# Patient Record
Sex: FEMALE | Race: WHITE | Employment: FULL TIME | URBAN - METROPOLITAN AREA
[De-identification: names, ages, dates, MRNs, and addresses within clinical notes are randomized per-mention and may not be internally consistent; named-entity substitution may affect disease eponyms.]

---

## 2018-08-28 ENCOUNTER — CARE COORDINATION (OUTPATIENT)
Dept: CARDIOLOGY | Facility: CLINIC | Age: 18
End: 2018-08-28

## 2018-08-28 DIAGNOSIS — Z82.49 FH: ARRHYTHMOGENIC RIGHT VENTRICULAR CARDIOMYOPATHY: Primary | ICD-10-CM

## 2018-10-12 ENCOUNTER — CARE COORDINATION (OUTPATIENT)
Dept: CARDIOLOGY | Facility: CLINIC | Age: 18
End: 2018-10-12

## 2018-10-12 ENCOUNTER — HOSPITAL ENCOUNTER (OUTPATIENT)
Dept: MRI IMAGING | Facility: CLINIC | Age: 18
Discharge: HOME | End: 2018-10-12
Attending: INTERNAL MEDICINE | Admitting: INTERNAL MEDICINE
Payer: COMMERCIAL

## 2018-10-12 DIAGNOSIS — Z82.49 FH: ARRHYTHMOGENIC RIGHT VENTRICULAR CARDIOMYOPATHY: ICD-10-CM

## 2018-10-12 PROCEDURE — 75561 CARDIAC MRI FOR MORPH W/DYE: CPT

## 2018-10-12 PROCEDURE — 75561 CARDIAC MRI FOR MORPH W/DYE: CPT | Mod: 26 | Performed by: INTERNAL MEDICINE

## 2018-10-12 PROCEDURE — A9585 GADOBUTROL INJECTION: HCPCS | Performed by: INTERNAL MEDICINE

## 2018-10-12 PROCEDURE — 25500064 ZZH RX 255 OP 636: Performed by: INTERNAL MEDICINE

## 2018-10-12 RX ORDER — GADOBUTROL 604.72 MG/ML
7.5 INJECTION INTRAVENOUS ONCE
Status: COMPLETED | OUTPATIENT
Start: 2018-10-12 | End: 2018-10-12

## 2018-10-12 RX ADMIN — GADOBUTROL 7.5 ML: 604.72 INJECTION INTRAVENOUS at 07:52

## 2018-10-12 NOTE — PROGRESS NOTES
Date: 10/12/2018    Time of Call: 4:34 PM     Diagnosis:  Family history of ARVC     [ TORB ] Ordering provider: Dr Shelley Manning  Order: Follow up with Dr Manning in 2 years with a cardiac MRI, 14 day zio, and EKG     Order received by: Alex Quiroga RN     Follow-up/additional notes: None.

## 2020-08-20 ENCOUNTER — CARE COORDINATION (OUTPATIENT)
Dept: CARDIOLOGY | Facility: CLINIC | Age: 20
End: 2020-08-20

## 2020-08-20 DIAGNOSIS — Z82.49 FH: ARRHYTHMOGENIC RIGHT VENTRICULAR CARDIOMYOPATHY: Primary | ICD-10-CM

## 2020-09-08 ENCOUNTER — ALLIED HEALTH/NURSE VISIT (OUTPATIENT)
Dept: CARDIOLOGY | Facility: CLINIC | Age: 20
End: 2020-09-08
Attending: INTERNAL MEDICINE
Payer: COMMERCIAL

## 2020-09-08 DIAGNOSIS — Z82.49 FH: ARRHYTHMOGENIC RIGHT VENTRICULAR CARDIOMYOPATHY: ICD-10-CM

## 2020-09-10 PROCEDURE — 0298T LEADLESS EKG MONITOR 3 TO 14 DAYS: CPT | Mod: ZP | Performed by: INTERNAL MEDICINE

## 2020-10-16 ENCOUNTER — OFFICE VISIT (OUTPATIENT)
Dept: CARDIOLOGY | Facility: CLINIC | Age: 20
End: 2020-10-16
Attending: INTERNAL MEDICINE
Payer: COMMERCIAL

## 2020-10-16 ENCOUNTER — HOSPITAL ENCOUNTER (OUTPATIENT)
Dept: MRI IMAGING | Facility: CLINIC | Age: 20
Discharge: HOME | End: 2020-10-16
Attending: INTERNAL MEDICINE | Admitting: INTERNAL MEDICINE
Payer: COMMERCIAL

## 2020-10-16 ENCOUNTER — MEDICAL CORRESPONDENCE (OUTPATIENT)
Dept: HEALTH INFORMATION MANAGEMENT | Facility: CLINIC | Age: 20
End: 2020-10-16

## 2020-10-16 VITALS
HEIGHT: 68 IN | DIASTOLIC BLOOD PRESSURE: 66 MMHG | BODY MASS INDEX: 23.19 KG/M2 | HEART RATE: 74 BPM | SYSTOLIC BLOOD PRESSURE: 120 MMHG | WEIGHT: 153 LBS | OXYGEN SATURATION: 99 %

## 2020-10-16 DIAGNOSIS — Z82.49 FH: ARRHYTHMOGENIC RIGHT VENTRICULAR CARDIOMYOPATHY: ICD-10-CM

## 2020-10-16 PROCEDURE — 93010 ELECTROCARDIOGRAM REPORT: CPT | Performed by: INTERNAL MEDICINE

## 2020-10-16 PROCEDURE — 93005 ELECTROCARDIOGRAM TRACING: CPT

## 2020-10-16 PROCEDURE — A9585 GADOBUTROL INJECTION: HCPCS | Performed by: INTERNAL MEDICINE

## 2020-10-16 PROCEDURE — G0463 HOSPITAL OUTPT CLINIC VISIT: HCPCS | Mod: 25

## 2020-10-16 PROCEDURE — 75561 CARDIAC MRI FOR MORPH W/DYE: CPT

## 2020-10-16 PROCEDURE — 75561 CARDIAC MRI FOR MORPH W/DYE: CPT | Mod: 26 | Performed by: INTERNAL MEDICINE

## 2020-10-16 PROCEDURE — 99203 OFFICE O/P NEW LOW 30 MIN: CPT | Mod: 25 | Performed by: INTERNAL MEDICINE

## 2020-10-16 PROCEDURE — 255N000002 HC RX 255 OP 636: Performed by: INTERNAL MEDICINE

## 2020-10-16 RX ORDER — GADOBUTROL 604.72 MG/ML
7.5 INJECTION INTRAVENOUS ONCE
Status: COMPLETED | OUTPATIENT
Start: 2020-10-16 | End: 2020-10-16

## 2020-10-16 RX ADMIN — GADOBUTROL 7.5 ML: 604.72 INJECTION INTRAVENOUS at 09:04

## 2020-10-16 ASSESSMENT — MIFFLIN-ST. JEOR: SCORE: 1502.5

## 2020-10-16 ASSESSMENT — PAIN SCALES - GENERAL: PAINLEVEL: NO PAIN (0)

## 2020-10-16 NOTE — NURSING NOTE
Chief Complaint   Patient presents with     New Patient     CV Genetics 10/16/2020: 20 year old female with family history of Arrthymogenic Right Venticular Cardiomyopathy presenting for evaluation.      Vitals were taken and medications were reconciled. EKG was performed.    Kiersten Parnell  2:10 PM

## 2020-10-16 NOTE — LETTER
"10/16/2020      RE: Arcelia Hyatt  [address]  [place] [address]       Dear Colleague,    Thank you for the opportunity to participate in the care of your patient, Arcelia Hyatt, at the [facility] [place] [facility]. Please see a copy of my visit note below.    HPI: 21 yo female with family history of arrhythmogenic cardiomyopathy presents for cardiac evaluation.  Pt reports that she has been feeling well.  She denies any chest pain or pressure, sob/zaragoza, orthopnea, pnd, palpitations, syncope/presyncope, tian or exercise intolerance.      PAST MEDICAL HISTORY:  None    PAST MEDICAL HISTORY:  [place] teeth removal    FAMILY HISTORY:  H/o arrhythmogenic cardiomyopathy.   Maternal uncle also with arrhythmias but felt 2/2 sleep apnea    SOCIAL HISTORY:  Social History     Socioeconomic History     Marital status: Single   Tobacco Use     Smoking status: None   Substance and Sexual Activity     Alcohol use: Occasional - 2 drinks/week     Drug use: None     Sexual activity: Heterosexual       CURRENT MEDICATIONS:  None    ROS:   Constitutional: No fever, chills, or sweats. No weight gain/loss.   ENT: No visual disturbance, ear ache, epistaxis, sore throat.   Allergies/Immunologic: Negative.   Respiratory: No cough, hemoptysis.   Cardiovascular: As per HPI.   GI: No nausea, vomiting, hematemesis, melena, or hematochezia.   : No urinary frequency, dysuria, or hematuria.   Integument: Negative.   Psychiatric: Negative.   Neuro: Negative.   Endocrinology: Negative.   Musculoskeletal: Negative.    EXAM:  /66 (BP Location: Right arm, Patient Position: Chair, Cuff Size: Adult Regular)   Pulse 74   Ht 1.727 m (5' 8\")   Wt 69.4 kg (153 lb)   SpO2 99%   BMI 23.26 kg/m    General: appears comfortable, alert and articulate  Head: normocephalic, atraumatic  Eyes: anicteric sclera, EOMI  Neck: no adenopathy, 2+ carotids without " bruits  Orophyarynx: moist mucosa, no lesions, dentition intact  Heart: regular, S1/S2, no murmur, gallop, rub, estimated JVP 6cm  Lungs: clear, no rales or wheezing  Abdomen: soft, non-tender, bowel sounds present, no hepatomegaly  Extremities: no clubbing, cyanosis or edema  Neurological: normal speech and affect, no gross motor deficits     14 day Zio Sept 2020      CMR Report 10-  SUMMARY  Clinical history: 20-year-old female with a family history of arrhythmogenic cardiomyopathy.  Comparison CMR: 10/12/2018  1. The LV is normal in cavity size and wall thickness. The global systolic function is normal. The LVEF is 58%. There are no regional wall motion abnormalities. There is no obvious fatty replacement.  2. The RV is normal in cavity size. The global systolic function is normal. There are no regional  abnormalities. The RVEF is 62%.   3. Both atria are normal in size.  4. There is no significant valvular disease.   5. Late gadolinium enhancement imaging shows no MI, fibrosis or infiltrative disease.   6. There is no pericardial effusion or thickening.  CONCLUSIONS:   No signs of arrhythmogenic cardiomyopathy. Compared to the prior CMR of 10/12/2018, there have been no changes.    Assessment and Plan:  19 yo female with family history of arrhythmogenic cardiomyopathy presents for cardiac evaluation.  1.  family history of arrhythmogenic cardiomyopathy:  Had extensive discussion with patient about pathophysiology of arrhythmogenic cardiomyopathy.   Pt's MRI and Zio monitoring grossly unremarkable.  Genetic status unknown.  Given both of these at present no indication to initiate beta-blockage therapy or place exercise restrictions at present.  Discussed with patient the need to avoid all stimulants including Sudafed and all ephedrine containing products as well as methamphetamines and cocaine and limit alcohol intake to less than 7 drinks per week..  Pt again reminded that all first degree relatives  should be screened for arrhythmogenic cardiomyopathy with cardiac MRI, EKG and Holter/Zio monitor.  If family members decide to pursue genetic testing, then clinical screening would be indicated in gene positive, not gene negative, individuals. Pt educated that any syncopal episode is considered a medical emergency requiring ER visit/evaluaion.      Follow-up:  2 years with an cardiac MRI, Zio, and cardiopulmonary stress test.  Will be happy to see sooner if change in clinical status or new questions/concerns arise.      Shelley Manning MD  Section Head - Advanced Heart Failure, Transplantation and Mechanical Circulatory Support  Director - Adult Congenital and Cardiovascular Genetics Center  Associate Professor of Medicine, [facility]      Please do not hesitate to contact me if you have any questions/concerns.     Sincerely,     Shelley Manning MD

## 2020-10-16 NOTE — PATIENT INSTRUCTIONS
You were seen today in the Adult Congenital and Cardiovascular Genetics Clinic at the [facility].    Cardiology Providers you saw during your visit:  Dr Shelley Manning    Diagnosis:  Family history Arrthymogenic Right Venticular Cardiomyopathy    Results:  The results of your MRI and Zio monitor in clinic today    Recommendations:    1.  Continue to eat a heart healthy, low salt diet.  2.  Continue to get 20-30 minutes of aerobic activity, 4-5 days per week.  Examples of aerobic activity include walking, running, swimming, cycling, etc.  3.  Continue to observe good oral hygiene, with regular dental visits.  4.  We recommend that you avoid any stimulants such as cocaine or methamphetamines   5.  Please limit your alcohol intake to less then 7 drinks per week (not in one sitting).  6.  If you are thinking about becoming pregnant, we would like to assess your heart function within prior to becoming pregnant.     SBE prophylaxis:   Yes____  No__x__    Lifelong Bacterial Endocarditis Prophylaxis:  YES____  NO____    If YES is checked, follow the recommendations outlined below:   1. Take antibiotic(s) prior to recommended dental procedures and procedures on the respiratory tract or with infected skin, muscle or bones. SBE prophylaxis is not needed for routine GI and  procedures (ie. Colonoscopy or vaginal delivery)   2. Observe good oral hygiene daily, as advised by your dentist. Get regular professional dental care.   3. Keep cuts clean.   4. Infections should be treated promptly.   5. Symptoms of Infective Endocarditis could include: fever lasting more than 4-5 days or a recurrent fever that initially resolves but returns within 1-2 days)     Exercise restrictions:   Yes____  No____         If yes, list restrictions:  Must be allowed to rest if fatigued or SOB      Work restrictions:  Yes____  No___x_         If yes, list restrictions:    FASTING CHOLESTEROL was checked in the last 5 years YES___  NO_x__    Continue to eat a heart healthy, low salt diet.         ____ Fasting lipid panel order today         ____ No changes in medications          ____ I recommend the following changes in your cholesterol medications.:          ____ Please follow up for cholesterol screening at your primary care physician      Follow-up:  Follow up with Dr Manning in two years with a cardiac MRI, Zio monitor, and CPX.    For after hours urgent needs, call [phone] and ask to speak to the Adult Congenital Physician on call.  Mention Job Code 0401.    For emergencies call 911.    For any scheduling needs, please call [facility] , at [phone]  Thank you for your visit today!  If you have questions or concerns about today's visit, please call me.    Alex Quiroga RN, BSN  Cardiology Care Coordinator  [facility]  [phone]    [address]  Mail [id number]  [place] [address]

## 2020-10-17 LAB — INTERPRETATION ECG - MUSE: NORMAL

## 2020-11-16 NOTE — PROGRESS NOTES
"HPI: 21 yo female with family history of arrhythmogenic cardiomyopathy presents for cardiac evaluation.  Pt reports that she has been feeling well.  She denies any chest pain or pressure, sob/zaragoza, orthopnea, pnd, palpitations, syncope/presyncope, tian or exercise intolerance.      PAST MEDICAL HISTORY:  None    PAST MEDICAL HISTORY:  [place] teeth removal    FAMILY HISTORY:  H/o arrhythmogenic cardiomyopathy.   Maternal uncle also with arrhythmias but felt 2/2 sleep apnea    SOCIAL HISTORY:  Social History     Socioeconomic History     Marital status: Single   Tobacco Use     Smoking status: None   Substance and Sexual Activity     Alcohol use: Occasional - 2 drinks/week     Drug use: None     Sexual activity: Heterosexual       CURRENT MEDICATIONS:  None    ROS:   Constitutional: No fever, chills, or sweats. No weight gain/loss.   ENT: No visual disturbance, ear ache, epistaxis, sore throat.   Allergies/Immunologic: Negative.   Respiratory: No cough, hemoptysis.   Cardiovascular: As per HPI.   GI: No nausea, vomiting, hematemesis, melena, or hematochezia.   : No urinary frequency, dysuria, or hematuria.   Integument: Negative.   Psychiatric: Negative.   Neuro: Negative.   Endocrinology: Negative.   Musculoskeletal: Negative.    EXAM:  /66 (BP Location: Right arm, Patient Position: Chair, Cuff Size: Adult Regular)   Pulse 74   Ht 1.727 m (5' 8\")   Wt 69.4 kg (153 lb)   SpO2 99%   BMI 23.26 kg/m    General: appears comfortable, alert and articulate  Head: normocephalic, atraumatic  Eyes: anicteric sclera, EOMI  Neck: no adenopathy, 2+ carotids without bruits  Orophyarynx: moist mucosa, no lesions, dentition intact  Heart: regular, S1/S2, no murmur, gallop, rub, estimated JVP 6cm  Lungs: clear, no rales or wheezing  Abdomen: soft, non-tender, bowel sounds present, no hepatomegaly  Extremities: no clubbing, cyanosis or edema  Neurological: normal speech and affect, no gross motor deficits     14 day Zio Sept " 2020      CMR Report 10-  SUMMARY  Clinical history: 20-year-old female with a family history of arrhythmogenic cardiomyopathy.  Comparison CMR: 10/12/2018  1. The LV is normal in cavity size and wall thickness. The global systolic function is normal. The LVEF is 58%. There are no regional wall motion abnormalities. There is no obvious fatty replacement.  2. The RV is normal in cavity size. The global systolic function is normal. There are no regional  abnormalities. The RVEF is 62%.   3. Both atria are normal in size.  4. There is no significant valvular disease.   5. Late gadolinium enhancement imaging shows no MI, fibrosis or infiltrative disease.   6. There is no pericardial effusion or thickening.  CONCLUSIONS:   No signs of arrhythmogenic cardiomyopathy. Compared to the prior CMR of 10/12/2018, there have been no changes.    Assessment and Plan:  21 yo female with family history of arrhythmogenic cardiomyopathy presents for cardiac evaluation.  1.  family history of arrhythmogenic cardiomyopathy:  Had extensive discussion with patient about pathophysiology of arrhythmogenic cardiomyopathy.   Pt's MRI and Zio monitoring grossly unremarkable.  Genetic status unknown.  Given both of these at present no indication to initiate beta-blockage therapy or place exercise restrictions at present.  Discussed with patient the need to avoid all stimulants including Sudafed and all ephedrine containing products as well as methamphetamines and cocaine and limit alcohol intake to less than 7 drinks per week..  Pt again reminded that all first degree relatives should be screened for arrhythmogenic cardiomyopathy with cardiac MRI, EKG and Holter/Zio monitor.  If family members decide to pursue genetic testing, then clinical screening would be indicated in gene positive, not gene negative, individuals. Pt educated that any syncopal episode is considered a medical emergency requiring ER visit/evaluaion.      Follow-up:  2  years with an cardiac MRI, Zio, and cardiopulmonary stress test.  Will be happy to see sooner if change in clinical status or new questions/concerns arise.      Shelley Manning MD  Section Head - Advanced Heart Failure, Transplantation and Mechanical Circulatory Support  Director - Adult Congenital and Cardiovascular Genetics Center  Associate Professor of Medicine, [facility]

## 2021-01-15 ENCOUNTER — HEALTH MAINTENANCE LETTER (OUTPATIENT)
Age: 21
End: 2021-01-15

## 2021-03-24 ASSESSMENT — ENCOUNTER SYMPTOMS
ARTHRALGIAS: 0
SHORTNESS OF BREATH: 0
PARESTHESIAS: 0
CONSTIPATION: 0
ABDOMINAL PAIN: 0
DIARRHEA: 0
SORE THROAT: 0
CHILLS: 0
COUGH: 0
HEARTBURN: 0
NAUSEA: 0
FEVER: 0
PALPITATIONS: 0
HEMATOCHEZIA: 0
FREQUENCY: 0
JOINT SWELLING: 0
HEMATURIA: 0
HEADACHES: 0
BREAST MASS: 0
WEAKNESS: 0
NERVOUS/ANXIOUS: 1
DIZZINESS: 0
EYE PAIN: 0
MYALGIAS: 0
DYSURIA: 0

## 2021-03-29 ENCOUNTER — OFFICE VISIT (OUTPATIENT)
Dept: FAMILY MEDICINE | Facility: CLINIC | Age: 21
End: 2021-03-29
Payer: COMMERCIAL

## 2021-03-29 VITALS
HEIGHT: 67 IN | SYSTOLIC BLOOD PRESSURE: 118 MMHG | RESPIRATION RATE: 16 BRPM | HEART RATE: 78 BPM | WEIGHT: 145.08 LBS | OXYGEN SATURATION: 98 % | BODY MASS INDEX: 22.77 KG/M2 | TEMPERATURE: 98.6 F | DIASTOLIC BLOOD PRESSURE: 76 MMHG

## 2021-03-29 DIAGNOSIS — Z11.4 SCREENING FOR HIV (HUMAN IMMUNODEFICIENCY VIRUS): ICD-10-CM

## 2021-03-29 DIAGNOSIS — L98.9 SKIN LESION: ICD-10-CM

## 2021-03-29 DIAGNOSIS — Z11.59 NEED FOR HEPATITIS C SCREENING TEST: ICD-10-CM

## 2021-03-29 DIAGNOSIS — F34.1 DYSTHYMIA: ICD-10-CM

## 2021-03-29 DIAGNOSIS — Z76.89 ESTABLISHING CARE WITH NEW DOCTOR, ENCOUNTER FOR: ICD-10-CM

## 2021-03-29 DIAGNOSIS — Z00.00 ROUTINE GENERAL MEDICAL EXAMINATION AT A HEALTH CARE FACILITY: Primary | ICD-10-CM

## 2021-03-29 DIAGNOSIS — Z12.4 SCREENING FOR CERVICAL CANCER: ICD-10-CM

## 2021-03-29 DIAGNOSIS — Z23 ENCOUNTER FOR VACCINATION: ICD-10-CM

## 2021-03-29 PROBLEM — N94.6 DYSMENORRHEA: Status: ACTIVE | Noted: 2018-12-03

## 2021-03-29 LAB
HCV AB SERPL QL IA: NONREACTIVE
HIV 1+2 AB+HIV1 P24 AG SERPL QL IA: NONREACTIVE

## 2021-03-29 PROCEDURE — 86803 HEPATITIS C AB TEST: CPT | Performed by: FAMILY MEDICINE

## 2021-03-29 PROCEDURE — 90471 IMMUNIZATION ADMIN: CPT | Performed by: FAMILY MEDICINE

## 2021-03-29 PROCEDURE — 99385 PREV VISIT NEW AGE 18-39: CPT | Mod: 25 | Performed by: FAMILY MEDICINE

## 2021-03-29 PROCEDURE — 96127 BRIEF EMOTIONAL/BEHAV ASSMT: CPT | Performed by: FAMILY MEDICINE

## 2021-03-29 PROCEDURE — 36415 COLL VENOUS BLD VENIPUNCTURE: CPT | Performed by: FAMILY MEDICINE

## 2021-03-29 PROCEDURE — 87389 HIV-1 AG W/HIV-1&-2 AB AG IA: CPT | Performed by: FAMILY MEDICINE

## 2021-03-29 PROCEDURE — 90715 TDAP VACCINE 7 YRS/> IM: CPT | Performed by: FAMILY MEDICINE

## 2021-03-29 PROCEDURE — G0145 SCR C/V CYTO,THINLAYER,RESCR: HCPCS | Performed by: FAMILY MEDICINE

## 2021-03-29 PROCEDURE — 99213 OFFICE O/P EST LOW 20 MIN: CPT | Mod: 25 | Performed by: FAMILY MEDICINE

## 2021-03-29 SDOH — HEALTH STABILITY: MENTAL HEALTH: HOW MANY STANDARD DRINKS CONTAINING ALCOHOL DO YOU HAVE ON A TYPICAL DAY?: 1 OR 2

## 2021-03-29 SDOH — HEALTH STABILITY: MENTAL HEALTH: HOW OFTEN DO YOU HAVE A DRINK CONTAINING ALCOHOL?: 2-3 TIMES A WEEK

## 2021-03-29 SDOH — HEALTH STABILITY: MENTAL HEALTH: HOW OFTEN DO YOU HAVE 6 OR MORE DRINKS ON ONE OCCASION?: NOT ASKED

## 2021-03-29 ASSESSMENT — ENCOUNTER SYMPTOMS
PALPITATIONS: 0
NERVOUS/ANXIOUS: 1
DIZZINESS: 0
HEMATOCHEZIA: 0
CHILLS: 0
SHORTNESS OF BREATH: 0
ABDOMINAL PAIN: 0
BREAST MASS: 0
FEVER: 0
EYE PAIN: 0
HEADACHES: 0
MYALGIAS: 0
HEMATURIA: 0
CONSTIPATION: 0
FREQUENCY: 0
WEAKNESS: 0
JOINT SWELLING: 0
COUGH: 0
DIARRHEA: 0
NAUSEA: 0
ARTHRALGIAS: 0
SORE THROAT: 0
HEARTBURN: 0
PARESTHESIAS: 0
DYSURIA: 0

## 2021-03-29 ASSESSMENT — ANXIETY QUESTIONNAIRES
IF YOU CHECKED OFF ANY PROBLEMS ON THIS QUESTIONNAIRE, HOW DIFFICULT HAVE THESE PROBLEMS MADE IT FOR YOU TO DO YOUR WORK, TAKE CARE OF THINGS AT HOME, OR GET ALONG WITH OTHER PEOPLE: NOT DIFFICULT AT ALL
3. WORRYING TOO MUCH ABOUT DIFFERENT THINGS: NOT AT ALL
2. NOT BEING ABLE TO STOP OR CONTROL WORRYING: NOT AT ALL
GAD7 TOTAL SCORE: 0
7. FEELING AFRAID AS IF SOMETHING AWFUL MIGHT HAPPEN: NOT AT ALL
1. FEELING NERVOUS, ANXIOUS, OR ON EDGE: NOT AT ALL
6. BECOMING EASILY ANNOYED OR IRRITABLE: NOT AT ALL
5. BEING SO RESTLESS THAT IT IS HARD TO SIT STILL: NOT AT ALL

## 2021-03-29 ASSESSMENT — PATIENT HEALTH QUESTIONNAIRE - PHQ9
SUM OF ALL RESPONSES TO PHQ QUESTIONS 1-9: 0
5. POOR APPETITE OR OVEREATING: NOT AT ALL

## 2021-03-29 ASSESSMENT — MIFFLIN-ST. JEOR: SCORE: 1445.71

## 2021-03-29 NOTE — NURSING NOTE
Prior to immunization administration, verified patients identity using patient s name and date of birth. Please see Immunization Activity for additional information.     Screening Questionnaire for Adult Immunization    Are you sick today?   No   Do you have allergies to medications, food, a vaccine component or latex?   No   Have you ever had a serious reaction after receiving a vaccination?   No   Do you have a long-term health problem with heart, lung, kidney, or metabolic disease (e.g., diabetes), asthma, a blood disorder, no spleen, complement component deficiency, a cochlear implant, or a spinal fluid leak?  Are you on long-term aspirin therapy?   No   Do you have cancer, leukemia, HIV/AIDS, or any other immune system problem?   No   Do you have a parent, brother, or sister with an immune system problem?   No   In the past 3 months, have you taken medications that affect  your immune system, such as prednisone, other steroids, or anticancer drugs; drugs for the treatment of rheumatoid arthritis, Crohn s disease, or psoriasis; or have you had radiation treatments?   No   Have you had a seizure, or a brain or other nervous system problem?   No   During the past year, have you received a transfusion of blood or blood    products, or been given immune (gamma) globulin or antiviral drug?   No   For women: Are you pregnant or is there a chance you could become       pregnant during the next month?   No   Have you received any vaccinations in the past 4 weeks?   No     Immunization questionnaire answers were all negative.        Per orders of Dr. Hope , injection of tdap (adacel) given by Ai Shepherd. Patient instructed to remain in clinic for 15 minutes afterwards, and to report any adverse reaction to me immediately.     Clinic Administered Medication Documentation      Injectable Medication Documentation    Patient was given tdap. Prior to medication administration, verified patients identity using patient s  name and date of birth. Please see MAR and medication order for additional information. Patient instructed to remain in clinic for 15 minutes.      Was entire vial of medication used? Yes  Vial/Syringe: Single dose vial  Expiration Date:  06/2022  Was this medication supplied by the patient? No    Screening performed by Ai Shepherd on 3/29/2021 at 1:00 PM.

## 2021-03-29 NOTE — PROGRESS NOTES
SUBJECTIVE:   CC: Arcelia Hyatt is an 21 year old woman who presents for preventive health visit.     Patient has been advised of split billing requirements and indicates understanding: Yes  Healthy Habits:     Getting at least 3 servings of Calcium per day:  Yes    Bi-annual eye exam:  Yes    Dental care twice a year:  NO    Sleep apnea or symptoms of sleep apnea:  None    Diet:  Regular (no restrictions)    Frequency of exercise:  2-3 days/week    Duration of exercise:  15-30 minutes    Taking medications regularly:  Yes    Medication side effects:  None    PHQ-2 Total Score: 2    Additional concerns today:  Yes (questions about pap smear)    Regular menses. Has Kylena IUD, placed 2 years ago (Jan. 2019).  Not currently sexually active.   Maternal and paternal grandmothers with hx of beast cancer.     Other issues:    Endorses lack of motivation and issues with sleep. Slightly depressed mood. Recently graduated and feels like she is having some difficulty adjusting. Would be interested in speaking with therapist.    Several skin lesion on back and abdomen that she would like checked has noticed several of them have changed in shape and color. One lesion biopsied a few years ago, slightly raised currently. Has not followed with dermatology in the past.    Today's PHQ-2 Score:   PHQ-2 ( 1999 Pfizer) 3/24/2021   Q1: Little interest or pleasure in doing things 1   Q2: Feeling down, depressed or hopeless 1   PHQ-2 Score 2   Q1: Little interest or pleasure in doing things Several days   Q2: Feeling down, depressed or hopeless Several days   PHQ-2 Score 2       Abuse: Current or Past (Physical, Sexual or Emotional) - No  Do you feel safe in your environment? Yes    Have you ever done Advance Care Planning? (For example, a Health Directive, POLST, or a discussion with a medical provider or your loved ones about your wishes): No, advance care planning information given to patient to review.  Patient plans to  "discuss their wishes with loved ones or provider.    Social History     Tobacco Use     Smoking status: Never Smoker     Smokeless tobacco: Never Used   Substance Use Topics     Alcohol use: Yes     Frequency: 2-3 times a week     Drinks per session: 1 or 2     Comment: 2 drinks a week      If you drink alcohol do you typically have >3 drinks per day or >7 drinks per week? No    No flowsheet data found.      Breast Cancer Screening: no indicated given age    History of abnormal Pap smear: NO - age 21-29 PAP every 3 years recommended     Reviewed and updated as needed this visit by clinical staff  Tobacco  Allergies  Meds   Med Hx  Surg Hx  Fam Hx  Soc Hx        Reviewed and updated as needed this visit by Provider                History reviewed. No pertinent past medical history.   History reviewed. No pertinent surgical history.    Review of Systems   Constitutional: Negative for chills and fever.   HENT: Negative for congestion, ear pain, hearing loss and sore throat.    Eyes: Negative for pain and visual disturbance.   Respiratory: Negative for cough and shortness of breath.    Cardiovascular: Negative for chest pain, palpitations and peripheral edema.   Gastrointestinal: Negative for abdominal pain, constipation, diarrhea, heartburn, hematochezia and nausea.   Breasts:  Negative for tenderness, breast mass and discharge.   Genitourinary: Negative for dysuria, frequency, genital sores, hematuria, pelvic pain, urgency, vaginal bleeding and vaginal discharge.   Musculoskeletal: Negative for arthralgias, joint swelling and myalgias.   Skin: Negative for rash.   Neurological: Negative for dizziness, weakness, headaches and paresthesias.   Psychiatric/Behavioral: Negative for mood changes. The patient is nervous/anxious.         OBJECTIVE:   /76 (BP Location: Right arm, Patient Position: Sitting, Cuff Size: Adult Regular)   Pulse 78   Temp 98.6  F (37  C) (Oral)   Resp 16   Ht 1.702 m (5' 7\")   Wt " 65.8 kg (145 lb 1.3 oz)   LMP 03/22/2021   SpO2 98%   BMI 22.72 kg/m    Physical Exam  GENERAL: healthy, alert and no distress  EYES: Eyes grossly normal to inspection, PERRL and conjunctivae and sclerae normal  HENT: ear canals and TM's normal  NECK: no adenopathy, no asymmetry, masses, or scars and thyroid normal to palpation  RESP: lungs clear to auscultation - no rales, rhonchi or wheezes  BREAST: normal without masses, tenderness or nipple discharge and no palpable axillary masses or adenopathy  CV: regular rate and rhythm, normal S1 S2, no S3 or S4, no murmur, click or rub, no peripheral edema and peripheral pulses strong  ABDOMEN: soft, nontender, no hepatosplenomegaly, no masses and bowel sounds normal   (female): normal female external genitalia, normal urethral meatus, vaginal mucosa pink, moist, well rugated, and normal cervix/adnexa/uterus without masses or discharge  MS: no gross musculoskeletal defects noted, no edema  SKIN: Scattered nevi on trunk, several with recent changes per patient.   NEURO: Normal strength and tone, mentation intact and speech normal  PSYCH: mentation appears normal, affect normal/bright      ASSESSMENT/PLAN:   Arcelia was seen today for physical.    Diagnoses and all orders for this visit:    Routine general medical examination at a health care facility    Screening for cervical cancer  -     PAP imaged thin layer screen only - recommended age 21 - 24 years    Screening for HIV (human immunodeficiency virus)  -     HIV Antigen Antibody Combo    Need for hepatitis C screening test  -     Hepatitis C Screen Reflex to HCV RNA Quant and Genotype    Encounter for vaccination  -     TDAP VACCINE (Adacel, Boostrix)  [[id number]]    Establishing care with new doctor, encounter for    Dysthymia  Patient would like to start with speaking to therapist. Follow-up if ongoing issues or would like to start medication.  -     MENTAL HEALTH REFERRAL  - Adult; Outpatient Treatment;  "Individual/Couples/Family/Group Therapy/Health Psychology; [facility]: [facility] [phone]; We will contact you to schedule the appointment or please call with any questions    Skin lesion  -Follow-up with derm for skin check  -     DERMATOLOGY ADULT REFERRAL; Future    COUNSELING:  Reviewed preventive health counseling, as reflected in patient instructions       Regular exercise       Healthy diet/nutrition       Consider Hep C screening for all patients one time for ages 18-79 years       HIV screeninx in teen years, 1x in adult years, and at intervals if high risk    Estimated body mass index is 22.72 kg/m  as calculated from the following:    Height as of this encounter: 1.702 m (5' 7\").    Weight as of this encounter: 65.8 kg (145 lb 1.3 oz).    She reports that she has never smoked. She has never used smokeless tobacco.    Counseling Resources:  ATP IV Guidelines  Pooled Cohorts Equation Calculator  Breast Cancer Risk Calculator  BRCA-Related Cancer Risk Assessment: FHS-7 Tool  FRAX Risk Assessment  ICSI Preventive Guidelines  Dietary Guidelines for Americans,   USDA's MyPlate  ASA Prophylaxis  Lung CA Screening    Mark Hope DO  [facility]  "

## 2021-03-30 ASSESSMENT — ANXIETY QUESTIONNAIRES: GAD7 TOTAL SCORE: 0

## 2021-03-31 LAB
COPATH REPORT: NORMAL
PAP: NORMAL

## 2021-10-10 ENCOUNTER — HEALTH MAINTENANCE LETTER (OUTPATIENT)
Age: 21
End: 2021-10-10

## 2022-01-18 DIAGNOSIS — Z82.49 FH: ARRHYTHMOGENIC RIGHT VENTRICULAR CARDIOMYOPATHY: Primary | ICD-10-CM

## 2022-01-18 NOTE — PROGRESS NOTES
Date: 1/18/2022     Time of Call: 2:58 PM     Diagnosis: family hx ARVC     [ TORB ] Ordering provider: Dr. Manning  Order: cardiac MRI, CPX, labs, 14 day zio prior to follow up appointment with Dr. Manning     Order received by: Ange Dean RN     Follow-up/additional notes: Orders from previous note entered

## 2022-05-21 ENCOUNTER — HEALTH MAINTENANCE LETTER (OUTPATIENT)
Age: 22
End: 2022-05-21

## 2022-09-18 ENCOUNTER — HEALTH MAINTENANCE LETTER (OUTPATIENT)
Age: 22
End: 2022-09-18

## 2022-11-01 ENCOUNTER — ALLIED HEALTH/NURSE VISIT (OUTPATIENT)
Dept: CARDIOLOGY | Facility: CLINIC | Age: 22
End: 2022-11-01
Payer: COMMERCIAL

## 2022-11-01 DIAGNOSIS — Z82.49 FH: ARRHYTHMOGENIC RIGHT VENTRICULAR CARDIOMYOPATHY: ICD-10-CM

## 2022-11-01 PROCEDURE — 93248 EXT ECG>7D<15D REV&INTERPJ: CPT | Performed by: INTERNAL MEDICINE

## 2022-11-01 PROCEDURE — 93246 EXT ECG>7D<15D RECORDING: CPT

## 2023-01-02 DIAGNOSIS — Z82.49 FH: ARRHYTHMOGENIC RIGHT VENTRICULAR CARDIOMYOPATHY: Primary | ICD-10-CM

## 2023-01-06 ENCOUNTER — HOSPITAL ENCOUNTER (OUTPATIENT)
Dept: MRI IMAGING | Facility: CLINIC | Age: 23
Discharge: HOME | End: 2023-01-06
Attending: INTERNAL MEDICINE
Payer: COMMERCIAL

## 2023-01-06 ENCOUNTER — OFFICE VISIT (OUTPATIENT)
Dept: CARDIOLOGY | Facility: CLINIC | Age: 23
End: 2023-01-06
Attending: INTERNAL MEDICINE
Payer: COMMERCIAL

## 2023-01-06 ENCOUNTER — LAB (OUTPATIENT)
Dept: LAB | Facility: CLINIC | Age: 23
End: 2023-01-06
Attending: INTERNAL MEDICINE
Payer: COMMERCIAL

## 2023-01-06 ENCOUNTER — HOSPITAL ENCOUNTER (OUTPATIENT)
Dept: CARDIOLOGY | Facility: CLINIC | Age: 23
Discharge: HOME | End: 2023-01-06
Attending: INTERNAL MEDICINE
Payer: COMMERCIAL

## 2023-01-06 VITALS
DIASTOLIC BLOOD PRESSURE: 76 MMHG | WEIGHT: 159.8 LBS | OXYGEN SATURATION: 99 % | BODY MASS INDEX: 25.03 KG/M2 | SYSTOLIC BLOOD PRESSURE: 112 MMHG | HEART RATE: 71 BPM

## 2023-01-06 DIAGNOSIS — Z82.49 FH: ARRHYTHMOGENIC RIGHT VENTRICULAR CARDIOMYOPATHY: ICD-10-CM

## 2023-01-06 LAB
ALBUMIN SERPL BCG-MCNC: 4.1 G/DL (ref 3.5–5.2)
ALP SERPL-CCNC: 68 U/L (ref 35–104)
ALT SERPL W P-5'-P-CCNC: 45 U/L (ref 10–35)
ANION GAP SERPL CALCULATED.3IONS-SCNC: 10 MMOL/L (ref 7–15)
AST SERPL W P-5'-P-CCNC: 41 U/L (ref 10–35)
BASOPHILS # BLD AUTO: 0.1 10E3/UL (ref 0–0.2)
BASOPHILS NFR BLD AUTO: 1 %
BILIRUB SERPL-MCNC: 0.2 MG/DL
BUN SERPL-MCNC: 7.7 MG/DL (ref 6–20)
CALCIUM SERPL-MCNC: 9.6 MG/DL (ref 8.6–10)
CHLORIDE SERPL-SCNC: 105 MMOL/L (ref 98–107)
CHOLEST SERPL-MCNC: 149 MG/DL
CREAT SERPL-MCNC: 0.63 MG/DL (ref 0.51–0.95)
DEPRECATED HCO3 PLAS-SCNC: 24 MMOL/L (ref 22–29)
EOSINOPHIL # BLD AUTO: 0.2 10E3/UL (ref 0–0.7)
EOSINOPHIL NFR BLD AUTO: 3 %
ERYTHROCYTE [DISTWIDTH] IN BLOOD BY AUTOMATED COUNT: 11.9 % (ref 10–15)
GFR SERPL CREATININE-BSD FRML MDRD: >90 ML/MIN/1.73M2
GLUCOSE SERPL-MCNC: 100 MG/DL (ref 70–99)
HCT VFR BLD AUTO: 44 % (ref 35–47)
HDLC SERPL-MCNC: 44 MG/DL
HGB BLD-MCNC: 14.7 G/DL (ref 11.7–15.7)
IMM GRANULOCYTES # BLD: 0 10E3/UL
IMM GRANULOCYTES NFR BLD: 1 %
LDLC SERPL CALC-MCNC: 90 MG/DL
LYMPHOCYTES # BLD AUTO: 2.4 10E3/UL (ref 0.8–5.3)
LYMPHOCYTES NFR BLD AUTO: 44 %
MCH RBC QN AUTO: 30.9 PG (ref 26.5–33)
MCHC RBC AUTO-ENTMCNC: 33.4 G/DL (ref 31.5–36.5)
MCV RBC AUTO: 93 FL (ref 78–100)
MONOCYTES # BLD AUTO: 0.4 10E3/UL (ref 0–1.3)
MONOCYTES NFR BLD AUTO: 8 %
NEUTROPHILS # BLD AUTO: 2.4 10E3/UL (ref 1.6–8.3)
NEUTROPHILS NFR BLD AUTO: 43 %
NONHDLC SERPL-MCNC: 105 MG/DL
NRBC # BLD AUTO: 0 10E3/UL
NRBC BLD AUTO-RTO: 0 /100
PLAT MORPH BLD: NORMAL
PLATELET # BLD AUTO: 299 10E3/UL (ref 150–450)
POTASSIUM SERPL-SCNC: 4.6 MMOL/L (ref 3.4–5.3)
PROT SERPL-MCNC: 7.2 G/DL (ref 6.4–8.3)
RBC # BLD AUTO: 4.75 10E6/UL (ref 3.8–5.2)
RBC MORPH BLD: NORMAL
SODIUM SERPL-SCNC: 139 MMOL/L (ref 136–145)
TRIGL SERPL-MCNC: 75 MG/DL
TSH SERPL DL<=0.005 MIU/L-ACNC: 2.17 UIU/ML (ref 0.3–4.2)
WBC # BLD AUTO: 5.4 10E3/UL (ref 4–11)

## 2023-01-06 PROCEDURE — 84443 ASSAY THYROID STIM HORMONE: CPT | Performed by: INTERNAL MEDICINE

## 2023-01-06 PROCEDURE — 99215 OFFICE O/P EST HI 40 MIN: CPT | Mod: 25 | Performed by: INTERNAL MEDICINE

## 2023-01-06 PROCEDURE — 80061 LIPID PANEL: CPT | Performed by: INTERNAL MEDICINE

## 2023-01-06 PROCEDURE — 94621 CARDIOPULM EXERCISE TESTING: CPT

## 2023-01-06 PROCEDURE — 36415 COLL VENOUS BLD VENIPUNCTURE: CPT | Performed by: INTERNAL MEDICINE

## 2023-01-06 PROCEDURE — G0463 HOSPITAL OUTPT CLINIC VISIT: HCPCS | Mod: 25 | Performed by: INTERNAL MEDICINE

## 2023-01-06 PROCEDURE — 99417 PROLNG OP E/M EACH 15 MIN: CPT | Mod: 25 | Performed by: INTERNAL MEDICINE

## 2023-01-06 PROCEDURE — G0463 HOSPITAL OUTPT CLINIC VISIT: HCPCS

## 2023-01-06 PROCEDURE — 75561 CARDIAC MRI FOR MORPH W/DYE: CPT | Mod: 26 | Performed by: INTERNAL MEDICINE

## 2023-01-06 PROCEDURE — 80053 COMPREHEN METABOLIC PANEL: CPT | Performed by: INTERNAL MEDICINE

## 2023-01-06 PROCEDURE — A9585 GADOBUTROL INJECTION: HCPCS | Performed by: INTERNAL MEDICINE

## 2023-01-06 PROCEDURE — 75561 CARDIAC MRI FOR MORPH W/DYE: CPT

## 2023-01-06 PROCEDURE — 94621 CARDIOPULM EXERCISE TESTING: CPT | Mod: 26 | Performed by: INTERNAL MEDICINE

## 2023-01-06 PROCEDURE — 85025 COMPLETE CBC W/AUTO DIFF WBC: CPT | Performed by: INTERNAL MEDICINE

## 2023-01-06 PROCEDURE — 255N000002 HC RX 255 OP 636: Performed by: INTERNAL MEDICINE

## 2023-01-06 RX ORDER — GADOBUTROL 604.72 MG/ML
7.5 INJECTION INTRAVENOUS ONCE
Status: COMPLETED | OUTPATIENT
Start: 2023-01-06 | End: 2023-01-06

## 2023-01-06 RX ORDER — DOXYCYCLINE 100 MG/1
CAPSULE ORAL
COMMUNITY
Start: 2022-11-09

## 2023-01-06 RX ADMIN — GADOBUTROL 7.5 ML: 604.72 INJECTION INTRAVENOUS at 09:00

## 2023-01-06 ASSESSMENT — PAIN SCALES - GENERAL: PAINLEVEL: NO PAIN (0)

## 2023-01-06 NOTE — PROGRESS NOTES
Cardiovascular Genetics Clinic Progress Note     Name: Arcelia Hyatt  : 2000  MRN: [id number]    2023    Dear Dr. Hope and colleagues,    I had the pleasure of seeing Arcelia Hyatt, a 22 year old female today 2023 in the [facility] Cardiovascular Genetics Clinic for a routine follow up visit for cardiovascular screening in the setting of a family history of arrhythmogenic cardiomyopathy.     As you know, her mother has AMC with a variant of unknown significance in the Ankrin 2 gene (ANK2 [id number]). She has had an RVOT VT ablation and AVNRT ablation in 2016 and a repeat VT ablation in May 2016. She also has an ICD.     She last saw my colleague, Dr. Shelley Manning in clinic in 2022. At that time she had a CMR with normal biventricular function, no fibrosis, and no criteria for ACM. Her ambulatory telemetry demonstrated largely normal sinus rhythm with no VT.      Overall, she is feeling well. She walks regularly to work 3 miles total and has no dyspnea or chest pain with activity. She also plays tennis and [employer] and has noted no change in her exercise tolerance. Her weight is stable, and she has no edema, orthopnea, or PND. She has not had palpitations, lightheadedness, presyncope, or syncope. She works in [employer] at [employer]. She has not been sleeping as well at night due to an increase in stress. She otherwise has good energy and appetite. She was a [employer] in [place], and recently traveled back. She is taking doxycyline for malaria prevention and has approximately 3 weeks left.     REVIEW OF SYSTEMS: 10 point ROS neg other than the symptoms noted above in the HPI    FAMILY HISTORY:   Mother with ACM wtih ANK2 variant of unknown significance  A paternal great maternal uncle  suddenly in his 40's or 50's. A paternal great aunt had a son who  in his 50's but details are unknown.  The same aunt  has a daughter who had open heart surgery but reasons are unknown.  Tanya's paternal grandfather  suddenly in his 60's or 70's.  Tanya's mother  at 59 years from inflammatory carcinoma and had a brain aneurysm at 45 years. Brothers Breezy and Minh, and sister Alecia do not have an ACM phenotype.   Younger brother, Breezy with perforin mutation causing histiocytosis (HLH), and all other children have this mutation too.   Family history of DM2     PAST MEDICAL HISTORY:   1. Family history of ACM as above      ALLERGIES:  No Known Allergies    MEDICATIONS:   levonorgestrel (KYLEENA) 19.5 MG IUD, 1 each by Intrauterine route once    No current facility-administered medications on file prior to visit.      SOCIAL HISTORY: She works in [employer] at [employer].  Tobacco: none  Alcohol: rare  Illicits: none      PHYSICAL EXAM:   /76 (BP Location: Right arm, Patient Position: Chair, Cuff Size: Adult Regular)   Pulse 71   Wt 72.5 kg (159 lb 12.8 oz)   SpO2 99%   BMI 25.03 kg/m    General: comfortable, conversant, NAD  HEENT: normocephalic, atraumatic, anicteric  Neck: Estimate JVP <7  CV: RRR, nl s1 and s2, no murmurs, gallops, or rubs   Lungs: CTAB, no crackles or wheezes, normal work of breathing  Abdomen: BS+, soft, non tender, non distended  Extremities: warm and well perfused, no edema    DIAGNOSTIC TESTING: personally reviewed    Latest Reference Range & Units 23 06:53   Sodium 136 - 145 mmol/L 139   Potassium 3.4 - 5.3 mmol/L 4.6   Chloride 98 - 107 mmol/L 105   Carbon Dioxide (CO2) 22 - 29 mmol/L 24   Urea Nitrogen 6.0 - 20.0 mg/dL 7.7   Creatinine 0.51 - 0.95 mg/dL 0.63   GFR Estimate >60 mL/min/1.73m2 >90   Calcium 8.6 - 10.0 mg/dL 9.6   Anion Gap 7 - 15 mmol/L 10   Albumin 3.5 - 5.2 g/dL 4.1   Protein Total 6.4 - 8.3 g/dL 7.2   Alkaline Phosphatase 35 - 104 U/L 68   ALT 10 - 35 U/L 45 (H)   AST 10 - 35 U/L 41 (H)   Bilirubin Total <=1.2 mg/dL 0.2   Cholesterol <200 mg/dL 149   Glucose 70  - 99 mg/dL 100 (H)   HDL Cholesterol >=50 mg/dL 44 (L)   LDL Cholesterol Calculated <=100 mg/dL 90   Non HDL Cholesterol <130 mg/dL 105   Triglycerides <150 mg/dL 75   TSH 0.30 - 4.20 uIU/mL 2.17   WBC 4.0 - 11.0 10e3/uL 5.4   Hemoglobin 11.7 - 15.7 g/dL 14.7   Hematocrit 35.0 - 47.0 % 44.0   Platelet Count 150 - 450 10e3/uL 299   (H): Data is abnormally high  (L): Data is abnormally low    CMR 1/6/2023:   Clinical history: 22-year-old woman with a family history of arrhythmogenic cardiomyopathy.  Comparison CMR: 10/16/2020, 10/12/2018     1. The LV is normal in cavity size and wall thickness. The global systolic function is normal. The LVEF is  61%. There are no regional wall motion abnormalities. There is no obvious fatty replacement.     2. The RV is normal in cavity size. The global systolic function is normal. There are no regional  abnormalities. The RVEF is 70%.      3. Both atria are normal in size.     4. There is no significant valvular disease.      5. Late gadolinium enhancement imaging shows no MI, fibrosis, or infiltrative disease.      6. There is no pericardial effusion or thickening.     CONCLUSIONS:      No signs of arrhythmogenic cardiomyopathy. Compared to the prior CMR of 10/16/2020, there have been no  changes.       Cardiopulmonary stress test 1/6/23: Exercise duration 13 min 55 seconds  Peak VO2 (ml/kg/min) VE/VCO2  [place] RER   34.42        24.60        1.17            Predicted VO2 (ml/kg/min) Predicted VO2 %   35.00        98            Resting Supine BP (mmHg) Resting Standing BP (mmHg) Final Stress BP (mmHg)   106/73        104/74        166/62          Resting Supine HR (bpm) Resting Standing HR (bpm)    53        80             Max HR (bpm) Max Predicted HR (bpm) Max Perdicted HR %   189        196        96            Exercise time (min) Exercise time (sec) Estimated workload (METS)   13        55        9.8            Ziopatch monitor: normal sinus rhythm with rare ectopy        ASSESSMENT AND PLAN: 21 yo female with family history of arrhythmogenic cardiomyopathy presents for cardiac evaluation      1.  family history of arrhythmogenic cardiomyopathy  2. Elevated fasting glucose  3. Mild transaminitis     She has had no change in her exercise tolerance. She is euvolemic and well compensated.  Her  CMR shows normal biventricular function, no fibrosis, and no criteria for ACM. Excellent functional capacity by cardiopulmonary exercise testing. ECG shows no changes and ziopatch shows sinus with no arrhythmias.     Since she is phenotype negative for ACM, there are no exercise restrictions though she should avoid stimulants and keep alcohol intake to <7 drinks per day.     We discussed family screening of all first degree relatives, which they have pursued.      We discussed that her mother has a variant of unknown significance and at this time we would not pursue genetic testing, unless it was reclassified as a likely pathogenic or pathogenic mutation. We will reassess the pathogenicity of the mutation and Ms. Anne will contact them if the classification changes.       Syncope: The patient was educated that any syncopal episode is considered a medical emergency requiring ER visit/evaluation     She should continue to have regular screening given his family history.     2. Elevated fasting glucose: Her fasting glucose is mildly elevated. She should follow up with her PCP. Continue moderate activity and lifestyle changes    3. Transaminitis: Her ALT and AST are mildly elevated. She is on doxycycline for malaria prophylaxis. Her should follow up with her PCP once her doxy is completed and have repeat LFTs.     PLAN:   -follow up in 2 years with Ms. Wells or Ms. Larson or sooner if any issues arise   -CMR, ziopatch, ECG and labs in 2 years     75 minutes on DOS for chart review, patient history and exam, counseling, review of diagnostic testing, coordination of care and documentation.      Thank you for allowing me to participate in the care of your patient. Please do not hesitate to contact me if you have any questions.     Sincerely,   Forum     Forum MD Lisa, PhD, [facility]  Advanced Heart Failure/Transplantation/MCS  [email or web address]

## 2023-01-06 NOTE — NURSING NOTE
Chief Complaint   Patient presents with     Follow Up     ACHD Visit Type: CV Genetics 1/6/2023: 22 year old female with family history of Arrthymogenic Right Venticular Cardiomyopathy presenting for evaluation.     Vitals were taken and medications reconciled.    Nicholas Garcia, EMT  3:33 PM

## 2023-01-06 NOTE — PATIENT INSTRUCTIONS
You were seen today in the Adult Congenital and Cardiovascular Genetics Clinic at the [facility].    Cardiology Providers you saw during your visit:  Twan Gonzalez MD    Diagnosis: family hx ARVC    Results:  Twan Gonzalez MD reviewed the results of your zio, cardiac MRI, CPX and lab testing today in clinic.    Recommendations for you:    No changes      General Cardiac Recommendations:  Continue to eat a heart healthy, low salt diet.  Continue to get 20-30 minutes of aerobic activity, 4-5 days per week.  Examples of aerobic activity include walking, running, swimming, cycling, etc.  Continue to observe good oral hygiene, with regular dental visits.      SBE prophylaxis:   Yes____  No__X__    If YES is checked, follow the recommendations outlined below:  Take antibiotic(s) prior to recommended dental procedures and procedures on the respiratory tract or with infected skin, muscle or bones. SBE prophylaxis is not needed for routine GI and  procedures (ie. Colonoscopy or vaginal delivery)  Observe good oral hygiene daily, as advised by your dentist. Get regular professional dental care.  Keep cuts clean.  Infections should be treated promptly.  Symptoms of Infective Endocarditis could include: fever lasting more than 4-5 days or a recurrent fever that initially resolves but returns within 1-2 days)      Exercise restrictions:   Yes__X__  No____         If yes, list restrictions:  Must be allowed to rest if fatigued or SOB      Work restrictions:  Yes____  No_X___         If yes, list restrictions:    FASTING CHOLESTEROL was checked in the last 5 years YES_X__  NO___ (2023)  If no, please follow up with your primary care physician. You should have a cholesterol screening every 5 years.      Follow-up: Follow up with Sonia Larson NP or Micki Wells NP in 2 years with a cardiac MRI, 7 day zio and labs prior.     If you have questions or concerns please contact us at:    Ange Dean, MPH, RN,  STEPHANY Williamson (Scheduling)  Nurse Care Coordinator     Clinic   Adult Congenital and CV Genetics   Adult Congenital and CV Genetic  [facility]  (P) [phone]     (P) [phone]  [email or web address]  (F) [phone]        For after hours urgent needs, call [phone] and ask to speak to the Adult Congenital Physician on call.  Mention Job Code [id number].    For emergencies call 911.    [facility]   Clinics and Surgery Center  Mail Code [id number]  [address] [place] [address]

## 2023-01-06 NOTE — LETTER
2023      RE: Arcelia Hyatt  [address] [place] [address]       Dear Colleague,    Thank you for the opportunity to participate in the care of your patient, Arcelia Hyatt, at the [facility] [place] [facility]. Please see a copy of my visit note below.    Cardiovascular Genetics Clinic Progress Note     Name: Arcelia Hyatt  : 2000  MRN: [id number]    2023    Dear Dr. Hope and colleagues,    I had the pleasure of seeing Arcelia Hyatt, a 22 year old female today 2023 in the [facility] Cardiovascular Genetics Clinic for a routine follow up visit for cardiovascular screening in the setting of a family history of arrhythmogenic cardiomyopathy.     As you know, her mother has AMC with a variant of unknown significance in the Ankrin 2 gene (ANK2 [id number]). She has had an RVOT VT ablation and AVNRT ablation in 2016 and a repeat VT ablation in May 2016. She also has an ICD.     She last saw my colleague, Dr. Shelley Manning in clinic in 2022. At that time she had a CMR with normal biventricular function, no fibrosis, and no criteria for ACM. Her ambulatory telemetry demonstrated largely normal sinus rhythm with no VT.      Overall, she is feeling well. She walks regularly to work 3 miles total and has no dyspnea or chest pain with activity. She also plays tennis and bro[employer] and has noted no change in her exercise tolerance. Her weight is stable, and she has no edema, orthopnea, or PND. She has not had palpitations, lightheadedness, presyncope, or syncope. She works in [employer] at [employer]. She has not been sleeping as well at night due to an increase in stress. She otherwise has good energy and appetite. She was a [employer] in [place], and recently traveled back. She is taking doxycyline for malaria prevention and has  approximately 3 weeks left.     REVIEW OF SYSTEMS: 10 point ROS neg other than the symptoms noted above in the HPI    FAMILY HISTORY:   Mother with ACM wtih ANK2 variant of unknown significance  A paternal great maternal uncle  suddenly in his 40's or 50's. A paternal great aunt had a son who  in his 50's but details are unknown.  The same aunt has a daughter who had open heart surgery but reasons are unknown.  Tanya's paternal grandfather  suddenly in his 60's or 70's.  Tanya's mother  at 59 years from inflammatory carcinoma and had a brain aneurysm at 45 years. Brothers Breezy and Minh, and sister Alecia do not have an ACM phenotype.   Younger brother, Breezy with perforin mutation causing histiocytosis (HLH), and all other children have this mutation too.   Family history of DM2     PAST MEDICAL HISTORY:   1. Family history of ACM as above      ALLERGIES:  No Known Allergies    MEDICATIONS:   levonorgestrel (KYLEENA) 19.5 MG IUD, 1 each by Intrauterine route once    No current facility-administered medications on file prior to visit.      SOCIAL HISTORY: She works in [employer] at [employer].  Tobacco: none  Alcohol: rare  Illicits: none      PHYSICAL EXAM:   /76 (BP Location: Right arm, Patient Position: Chair, Cuff Size: Adult Regular)   Pulse 71   Wt 72.5 kg (159 lb 12.8 oz)   SpO2 99%   BMI 25.03 kg/m    General: comfortable, conversant, NAD  HEENT: normocephalic, atraumatic, anicteric  Neck: Estimate JVP <7  CV: RRR, nl s1 and s2, no murmurs, gallops, or rubs   Lungs: CTAB, no crackles or wheezes, normal work of breathing  Abdomen: BS+, soft, non tender, non distended  Extremities: warm and well perfused, no edema    DIAGNOSTIC TESTING: personally reviewed    Latest Reference Range & Units 23 06:53   Sodium 136 - 145 mmol/L 139   Potassium 3.4 - 5.3 mmol/L 4.6   Chloride 98 - 107 mmol/L 105   Carbon Dioxide (CO2) 22 - 29 mmol/L 24   Urea Nitrogen 6.0 - 20.0 mg/dL 7.7    Creatinine 0.51 - 0.95 mg/dL 0.63   GFR Estimate >60 mL/min/1.73m2 >90   Calcium 8.6 - 10.0 mg/dL 9.6   Anion Gap 7 - 15 mmol/L 10   Albumin 3.5 - 5.2 g/dL 4.1   Protein Total 6.4 - 8.3 g/dL 7.2   Alkaline Phosphatase 35 - 104 U/L 68   ALT 10 - 35 U/L 45 (H)   AST 10 - 35 U/L 41 (H)   Bilirubin Total <=1.2 mg/dL 0.2   Cholesterol <200 mg/dL 149   Glucose 70 - 99 mg/dL 100 (H)   HDL Cholesterol >=50 mg/dL 44 (L)   LDL Cholesterol Calculated <=100 mg/dL 90   Non HDL Cholesterol <130 mg/dL 105   Triglycerides <150 mg/dL 75   TSH 0.30 - 4.20 uIU/mL 2.17   WBC 4.0 - 11.0 10e3/uL 5.4   Hemoglobin 11.7 - 15.7 g/dL 14.7   Hematocrit 35.0 - 47.0 % 44.0   Platelet Count 150 - 450 10e3/uL 299   (H): Data is abnormally high  (L): Data is abnormally low    CMR 1/6/2023:   Clinical history: 22-year-old woman with a family history of arrhythmogenic cardiomyopathy.  Comparison CMR: 10/16/2020, 10/12/2018     1. The LV is normal in cavity size and wall thickness. The global systolic function is normal. The LVEF is  61%. There are no regional wall motion abnormalities. There is no obvious fatty replacement.     2. The RV is normal in cavity size. The global systolic function is normal. There are no regional  abnormalities. The RVEF is 70%.      3. Both atria are normal in size.     4. There is no significant valvular disease.      5. Late gadolinium enhancement imaging shows no MI, fibrosis, or infiltrative disease.      6. There is no pericardial effusion or thickening.     CONCLUSIONS:      No signs of arrhythmogenic cardiomyopathy. Compared to the prior CMR of 10/16/2020, there have been no  changes.       Cardiopulmonary stress test 1/6/23: Exercise duration 13 min 55 seconds  Peak VO2 (ml/kg/min) VE/VCO2  [place] RER   34.42        24.60        1.17            Predicted VO2 (ml/kg/min) Predicted VO2 %   35.00        98            Resting Supine BP (mmHg) Resting Standing BP (mmHg) Final Stress BP (mmHg)   106/73        104/74         166/62          Resting Supine HR (bpm) Resting Standing HR (bpm)    53        80             Max HR (bpm) Max Predicted HR (bpm) Max Perdicted HR %   189        196        96            Exercise time (min) Exercise time (sec) Estimated workload (METS)   13        55        9.8            Ziopatch monitor: normal sinus rhythm with rare ectopy       ASSESSMENT AND PLAN: 21 yo female with family history of arrhythmogenic cardiomyopathy presents for cardiac evaluation      1.  family history of arrhythmogenic cardiomyopathy  2. Elevated fasting glucose  3. Mild transaminitis     She has had no change in her exercise tolerance. She is euvolemic and well compensated.  Her  CMR shows normal biventricular function, no fibrosis, and no criteria for ACM. Excellent functional capacity by cardiopulmonary exercise testing. ECG shows no changes and ziopatch shows sinus with no arrhythmias.     Since she is phenotype negative for ACM, there are no exercise restrictions though she should avoid stimulants and keep alcohol intake to <7 drinks per day.     We discussed family screening of all first degree relatives, which they have pursued.      We discussed that her mother has a variant of unknown significance and at this time we would not pursue genetic testing, unless it was reclassified as a likely pathogenic or pathogenic mutation. We will reassess the pathogenicity of the mutation and MsTyler Dayana will contact them if the classification changes.       Syncope: The patient was educated that any syncopal episode is considered a medical emergency requiring ER visit/evaluation     She should continue to have regular screening given his family history.     2. Elevated fasting glucose: Her fasting glucose is mildly elevated. She should follow up with her PCP. Continue moderate activity and lifestyle changes    3. Transaminitis: Her ALT and AST are mildly elevated. She is on doxycycline for malaria prophylaxis. Her should follow  up with her PCP once her doxy is completed and have repeat LFTs.     PLAN:   -follow up in 2 years with Ms. Wells or Ms. Larson or sooner if any issues arise   -CMR, ziopatch, ECG and labs in 2 years     75 minutes on DOS for chart review, patient history and exam, counseling, review of diagnostic testing, coordination of care and documentation.     Thank you for allowing me to participate in the care of your patient. Please do not hesitate to contact me if you have any questions.     Sincerely,   Forum     Forum MD Lisa, PhD, [facility]  Advanced Heart Failure/Transplantation/MCS  [facility]/[employer]

## 2023-01-09 LAB
CARDIOPULMONARY ANAEROBIC THRESHOLD PREDICTED PEAK: 83 %
CARDIOPULMONARY ANAEROBIC THRESHOLD VO2: 29.2 ML/KG/MIN
CARDIOPULMONARY BLOOD PRESSURE REST: NORMAL MMHG
CARDIOPULMONARY BREATHING RESERVE REST: 94.8
CARDIOPULMONARY BREATHING RESERVE V02MAX: 33.9
CARDIOPULMONARY CO2 OUTPUT REST: 196 ML/MIN
CARDIOPULMONARY CO2 OUTPUT VO2MAX: 3151 ML/MIN
CARDIOPULMONARY FEV 1.0 (L) ACTUAL: 3.42
CARDIOPULMONARY FEV 1.0 (L) PRECENT: 93 %
CARDIOPULMONARY FEV 1.0 (L) PREDICTED: 3.66
CARDIOPULMONARY FEV 1.0 FVC (%) ACTUAL: 79
CARDIOPULMONARY FEV 1.0 FVC (%) PERCENT: 92 %
CARDIOPULMONARY FEV 1.0 FVC (%) PREDICTED: 86
CARDIOPULMONARY FUNCTIONAL CAPACITY MAX ML/KG/MIN: 34.42 ML/KG/MIN
CARDIOPULMONARY FUNCTIONAL CAPACITY PERCENT: 98 %
CARDIOPULMONARY FUNCTIONAL CAPACITY PREDICTED: 35 ML/KG/MIN
CARDIOPULMONARY FVC (L) ACTUAL: 4.34
CARDIOPULMONARY FVC (L) PERCENT: 101 %
CARDIOPULMONARY FVC (L) PREDICTED: 4.29
CARDIOPULMONARY HEART RATE REST: 80 BPM
CARDIOPULMONARY MET'S REST: 0.8
CARDIOPULMONARY MINUTE VENTILATION REST: 6.2 L/MIN
CARDIOPULMONARY MINUTE VENTILATION VO2MAX: 79 L/MIN
CARDIOPULMONARY MYOCARDIAC O2 DEMAND MAX: NORMAL
CARDIOPULMONARY OXYGEN CONSUMPTION REST: 2.8 ML/KG/MIN
CARDIOPULMONARY OXYGEN CONSUMPTION VO2MAX: 34.42 ML/KG/MIN
CARDIOPULMONARY OXYGEN PULSE REST: 2 ML/BEAT
CARDIOPULMONARY OXYGEN PULSE VO2MAX: 14 ML/BEAT
CARDIOPULMONARY OXYGEN SATURATION- OXIMETRY REST: 100 %
CARDIOPULMONARY OXYGEN SATURATION- OXIMETRY VO2MAX: 96 %
CARDIOPULMONARY PET C02 REST: 36
CARDIOPULMONARY PET C02 VO2MAX: 43
CARDIOPULMONARY PET02 REST: 108
CARDIOPULMONARY PET02 V02 MAX: 109
CARDIOPULMONARY RER: 1.17
CARDIOPULMONARY RESPIRALORY EXCHANGE RATIO VO2MAX: 1.2
CARDIOPULMONARY RESPIRALORY EXCHANGE RATIO: 0.97
CARDIOPULMONARY RESPIRATORY RATE REST: 8 BR/MIN
CARDIOPULMONARY RESPIRATORY RATE VO2MAX: 33 BR/MIN
CARDIOPULMONARY STRESS BASE 1 BP MMHG: NORMAL MMHG
CARDIOPULMONARY STRESS BASE 1 BPA: 162 BPM
CARDIOPULMONARY STRESS BASE 1 SPO2: 97 % SPO2
CARDIOPULMONARY STRESS BASE 1 TIME SEC: 0 SEC
CARDIOPULMONARY STRESS BASE 1 TIME: 1 MINS
CARDIOPULMONARY STRESS BASE 2 BP MMHG: NORMAL MMHG
CARDIOPULMONARY STRESS BASE 2 BPA: 126 BPM
CARDIOPULMONARY STRESS BASE 2 SPO2: 99 % SPO2
CARDIOPULMONARY STRESS BASE 2 TIME SEC: 0 SEC
CARDIOPULMONARY STRESS BASE 2 TIME: 3 MINS
CARDIOPULMONARY STRESS BASE 3 BP MMHG: NORMAL MMHG
CARDIOPULMONARY STRESS BASE 3 BPA: 102 BPM
CARDIOPULMONARY STRESS BASE 3 SPO2: 99 % SPO2
CARDIOPULMONARY STRESS BASE 3 TIME SEC: 0 SEC
CARDIOPULMONARY STRESS BASE 3 TIME: 5 MINS
CARDIOPULMONARY STRESS PHASE 1 BP MMHG: NORMAL MMHG
CARDIOPULMONARY STRESS PHASE 1 BPM: 90 BPM
CARDIOPULMONARY STRESS PHASE 1 SPO2: 100 % SPO2
CARDIOPULMONARY STRESS PHASE 1 TIME SEC: 0 SEC
CARDIOPULMONARY STRESS PHASE 1 TIME: 3 MINS
CARDIOPULMONARY STRESS PHASE 2 BP MMHG: NORMAL MMHG
CARDIOPULMONARY STRESS PHASE 2 BPM: 121 BPM
CARDIOPULMONARY STRESS PHASE 2 SPO2: 99 % SPO2
CARDIOPULMONARY STRESS PHASE 2 TIME SEC: 0 SEC
CARDIOPULMONARY STRESS PHASE 2 TIME: 6 MINS
CARDIOPULMONARY STRESS PHASE 3 BP MMHG: NORMAL MMHG
CARDIOPULMONARY STRESS PHASE 3 BPM: 138 BPM
CARDIOPULMONARY STRESS PHASE 3 SPO2: 99 % SPO2
CARDIOPULMONARY STRESS PHASE 3 TIME SEC: 0 SEC
CARDIOPULMONARY STRESS PHASE 3 TIME: 9 MINS
CARDIOPULMONARY STRESS PHASE 4 BP MMHG: NORMAL MMHG
CARDIOPULMONARY STRESS PHASE 4 BPM: 166 BPM
CARDIOPULMONARY STRESS PHASE 4 SPO2: 100 % SPO2
CARDIOPULMONARY STRESS PHASE 4 TIME SEC: 0 SEC
CARDIOPULMONARY STRESS PHASE 4 TIME: 12 MINS
CARDIOPULMONARY STRESS PHASE 5 BP MMHG: NORMAL MMHG
CARDIOPULMONARY STRESS PHASE 5 BPM: 189 BPM
CARDIOPULMONARY STRESS PHASE 5 SPO2: 96 % SPO2
CARDIOPULMONARY STRESS PHASE 5 TIME SEC: 55 SEC
CARDIOPULMONARY STRESS PHASE 5 TIME: 13 MINS
CARDIOPULMONARY SVC (L) ACTUAL: 4.41
CARDIOPULMONARY SVC (L) PERCENT: 103 %
CARDIOPULMONARY SVC (L) PREDICTED: 4.29
CARDIOPULMONARY TIDAL VOLUME REST: 780 ML
CARDIOPULMONARY TIDAL VOLUME VO2MAX: 2369 ML
CARDIOPULMONARY VE/VCO2 SLOPE: 24.6
CARDIOPULMONARY VENTILATORY EQUIVALENT 02 REST: 31
CARDIOPULMONARY VENTILATORY EQUIVALENT 02 V02: 31
CARDIOPULMONARY VENTILATORY EQUIVALENT C02 REST: 32
CARDIOPULMONARY VENTILATORY EQUIVALENT C02 SLOPE VO2MAX: 24.6
CARDIOPULMONARY VENTILATORY EQUIVALENT C02 VO2MAX: 25
CV STRESS MAX HR HE: 189
PREDICTED VO2MAX: 35
RATED PERCEIVED EXERTION: 18
STRESS ECHO BASELINE BP: NORMAL MMHG
STRESS ECHO BASELINE HR: 53 BPM
STRESS ECHO CALCULATED PERCENT HR: 96 %
STRESS ECHO LAST STRESS BP: NORMAL MMHG
STRESS ECHO POST ESTIMATED WORKLOAD: 9.8 METS
STRESS ECHO POST EXERCISE DUR MIN: 13 MIN
STRESS ECHO POST EXERCISE DUR SEC: 55 SEC
STRESS ECHO TARGET HR: 196

## 2023-06-04 ENCOUNTER — HEALTH MAINTENANCE LETTER (OUTPATIENT)
Age: 23
End: 2023-06-04

## 2024-07-14 ENCOUNTER — HEALTH MAINTENANCE LETTER (OUTPATIENT)
Age: 24
End: 2024-07-14

## 2025-07-19 ENCOUNTER — HEALTH MAINTENANCE LETTER (OUTPATIENT)
Age: 25
End: 2025-07-19